# Patient Record
Sex: FEMALE
[De-identification: names, ages, dates, MRNs, and addresses within clinical notes are randomized per-mention and may not be internally consistent; named-entity substitution may affect disease eponyms.]

---

## 2017-11-06 ENCOUNTER — RESULT REVIEW (OUTPATIENT)
Age: 27
End: 2017-11-06

## 2018-01-09 ENCOUNTER — TRANSCRIPTION ENCOUNTER (OUTPATIENT)
Age: 28
End: 2018-01-09

## 2019-06-11 ENCOUNTER — TRANSCRIPTION ENCOUNTER (OUTPATIENT)
Age: 29
End: 2019-06-11

## 2019-08-08 PROBLEM — Z00.00 ENCOUNTER FOR PREVENTIVE HEALTH EXAMINATION: Status: ACTIVE | Noted: 2019-08-08

## 2019-08-14 ENCOUNTER — APPOINTMENT (OUTPATIENT)
Dept: OTOLARYNGOLOGY | Facility: CLINIC | Age: 29
End: 2019-08-14
Payer: MEDICAID

## 2019-08-14 PROCEDURE — 99203 OFFICE O/P NEW LOW 30 MIN: CPT

## 2019-08-15 NOTE — PHYSICAL EXAM
[Normal] : mucosa is normal [Midline] : trachea located in midline position [de-identified] : status post right ear dermal piercing, cartilage and soft tissue growth.

## 2019-08-15 NOTE — CONSULT LETTER
[Dear  ___] : Dear  [unfilled], [Consult Letter:] : I had the pleasure of evaluating your patient, [unfilled]. [Consult Closing:] : Thank you very much for allowing me to participate in the care of this patient.  If you have any questions, please do not hesitate to contact me. [Please see my note below.] : Please see my note below. [Sincerely,] : Sincerely, [FreeTextEntry3] : Thang Anderson MD, FACS\par Professor of Otolaryngology, [facility] [employer] [facility] [employer] at [facility]\par Director, [facility], [facility]\par , Head & Neck Service Line, [facility]\par

## 2019-08-15 NOTE — HISTORY OF PRESENT ILLNESS
[de-identified] : 29 years old female patient with history of  status post right ear dermal piercing for the past 5 months.   Patient is present today in the office with status post right ear dermal piercing, cartilage and soft tissue growth.  Patient will be monitored and observed for the next couple of months

## 2019-08-15 NOTE — REASON FOR VISIT
[Initial Evaluation] : an initial evaluation for [FreeTextEntry2] : status post right ear dermal piercing for the past 5 months.  Patient states her level of severity is a level 1 out of 10 and it occurs constant.  Patient states nothing helps to improve or worsens her status post right ear dermal piercing for the past 5 months.

## 2019-10-17 ENCOUNTER — APPOINTMENT (OUTPATIENT)
Dept: OTOLARYNGOLOGY | Facility: CLINIC | Age: 29
End: 2019-10-17
Payer: MEDICAID

## 2019-10-17 VITALS
RESPIRATION RATE: 14 BRPM | SYSTOLIC BLOOD PRESSURE: 127 MMHG | TEMPERATURE: 98.5 F | OXYGEN SATURATION: 99 % | HEART RATE: 72 BPM | DIASTOLIC BLOOD PRESSURE: 86 MMHG

## 2019-10-17 VITALS — HEIGHT: 60 IN | WEIGHT: 148 LBS | BODY MASS INDEX: 29.06 KG/M2

## 2019-10-17 PROCEDURE — 99213 OFFICE O/P EST LOW 20 MIN: CPT

## 2019-10-17 NOTE — HISTORY OF PRESENT ILLNESS
[de-identified] : 29 years old female patient with history of  status post right ear dermal piercing Cyst.   Patient is present today in the office with persistent post right ear dermal piercing, cartilage and soft tissue growth.

## 2019-10-17 NOTE — CONSULT LETTER
[Please see my note below.] : Please see my note below. [Consult Closing:] : Thank you very much for allowing me to participate in the care of this patient.  If you have any questions, please do not hesitate to contact me. [Sincerely,] : Sincerely, [FreeTextEntry3] : Thang Anderson MD, FACS\par Professor of Otolaryngology, [facility] [employer] at hospitals/[facility]\par Director, [facility], [facility]\par , Head & Neck Service Line, [facility]\par

## 2019-10-17 NOTE — REASON FOR VISIT
[Subsequent Evaluation] : a subsequent evaluation for [FreeTextEntry2] : status post right ear dermal piercing for the past 5 months.

## 2019-10-17 NOTE — PHYSICAL EXAM
[Midline] : trachea located in midline position [Normal] : no rashes [de-identified] : status post right ear dermal piercing, cartilage and soft tissue growth.

## 2019-10-31 ENCOUNTER — APPOINTMENT (OUTPATIENT)
Dept: OTOLARYNGOLOGY | Facility: CLINIC | Age: 29
End: 2019-10-31
Payer: MEDICAID

## 2019-10-31 ENCOUNTER — LABORATORY RESULT (OUTPATIENT)
Age: 29
End: 2019-10-31

## 2019-10-31 ENCOUNTER — TRANSCRIPTION ENCOUNTER (OUTPATIENT)
Age: 29
End: 2019-10-31

## 2019-10-31 VITALS
OXYGEN SATURATION: 97 % | DIASTOLIC BLOOD PRESSURE: 63 MMHG | TEMPERATURE: 98.3 F | HEART RATE: 79 BPM | SYSTOLIC BLOOD PRESSURE: 119 MMHG

## 2019-10-31 DIAGNOSIS — Z78.9 OTHER SPECIFIED HEALTH STATUS: ICD-10-CM

## 2019-10-31 DIAGNOSIS — F17.200 NICOTINE DEPENDENCE, UNSPECIFIED, UNCOMPLICATED: ICD-10-CM

## 2019-10-31 PROCEDURE — 11441 EXC FACE-MM B9+MARG 0.6-1 CM: CPT

## 2019-10-31 NOTE — HISTORY OF PRESENT ILLNESS
[de-identified] : 29 years old female patient with history of  status post right ear dermal piercing Cyst.   Patient is present today in the office with for excision of right ear dermal soft tissue growth.

## 2019-10-31 NOTE — PHYSICAL EXAM
[Midline] : trachea located in midline position [Normal] : no rashes [de-identified] : status post right ear dermal piercing, cartilage and soft tissue growth.

## 2019-10-31 NOTE — REASON FOR VISIT
[Subsequent Evaluation] : a subsequent evaluation for [FreeTextEntry2] : persistent post right ear dermal piercing, cartilage and soft tissue growth.

## 2019-10-31 NOTE — PROCEDURE
[FreeTextEntry1] : Excision of right ear dermal soft tissue growth.  [FreeTextEntry3] : Excision of right ear dermal soft tissue growth.

## 2019-11-01 PROBLEM — Z78.9 KNOWN HEALTH PROBLEMS: NONE: Status: RESOLVED | Noted: 2019-11-01 | Resolved: 2019-11-01

## 2019-11-01 PROBLEM — F17.200 CURRENT SOME DAY SMOKER: Status: ACTIVE | Noted: 2019-11-01

## 2019-11-08 ENCOUNTER — APPOINTMENT (OUTPATIENT)
Dept: OTOLARYNGOLOGY | Facility: CLINIC | Age: 29
End: 2019-11-08
Payer: MEDICAID

## 2019-11-08 VITALS
TEMPERATURE: 98.1 F | OXYGEN SATURATION: 99 % | HEART RATE: 61 BPM | DIASTOLIC BLOOD PRESSURE: 80 MMHG | SYSTOLIC BLOOD PRESSURE: 136 MMHG | RESPIRATION RATE: 15 BRPM

## 2019-11-08 DIAGNOSIS — L72.0 EPIDERMAL CYST: ICD-10-CM

## 2019-11-08 DIAGNOSIS — L91.0 HYPERTROPHIC SCAR: ICD-10-CM

## 2019-11-08 PROCEDURE — 99024 POSTOP FOLLOW-UP VISIT: CPT

## 2019-11-08 NOTE — HISTORY OF PRESENT ILLNESS
[de-identified] : 29 years old female patient with history of  status post right ear dermal piercing Cyst.   Patient is present today in the office with for Status post  excision of right ear dermal soft tissue growth.   Suture removal and wound care.\par Surgical Final Report:       Final Diagnosis\par Skin of right ear, lesion excision:\par - Skin with keratinous cyst, epidermal type, having\par           surrounding fibrosis with suggestion of early keloid formation\par

## 2019-11-08 NOTE — REASON FOR VISIT
[Subsequent Evaluation] : a subsequent evaluation for [FreeTextEntry2] : Status post excision of right ear dermal soft tissue growth.  Suture removal

## 2019-11-08 NOTE — PHYSICAL EXAM
[Normal] : no rashes [de-identified] : status post right ear dermal piercing, cartilage and soft tissue growth.

## 2020-05-11 ENCOUNTER — RESULT REVIEW (OUTPATIENT)
Age: 30
End: 2020-05-11

## 2021-01-16 ENCOUNTER — TRANSCRIPTION ENCOUNTER (OUTPATIENT)
Age: 31
End: 2021-01-16

## 2021-02-13 ENCOUNTER — TRANSCRIPTION ENCOUNTER (OUTPATIENT)
Age: 31
End: 2021-02-13

## 2023-07-07 ENCOUNTER — APPOINTMENT (OUTPATIENT)
Dept: ORTHOPEDIC SURGERY | Facility: CLINIC | Age: 33
End: 2023-07-07